# Patient Record
Sex: MALE | Race: BLACK OR AFRICAN AMERICAN | NOT HISPANIC OR LATINO
[De-identification: names, ages, dates, MRNs, and addresses within clinical notes are randomized per-mention and may not be internally consistent; named-entity substitution may affect disease eponyms.]

---

## 2017-02-08 ENCOUNTER — RX RENEWAL (OUTPATIENT)
Age: 77
End: 2017-02-08

## 2017-08-30 ENCOUNTER — RX RENEWAL (OUTPATIENT)
Age: 77
End: 2017-08-30

## 2017-12-13 ENCOUNTER — APPOINTMENT (OUTPATIENT)
Dept: UROLOGY | Facility: CLINIC | Age: 77
End: 2017-12-13
Payer: MEDICARE

## 2017-12-13 PROCEDURE — 99213 OFFICE O/P EST LOW 20 MIN: CPT

## 2017-12-13 PROCEDURE — 51798 US URINE CAPACITY MEASURE: CPT

## 2017-12-18 LAB
ANION GAP SERPL CALC-SCNC: 11 MMOL/L
BASOPHILS # BLD AUTO: 0.09 K/UL
BASOPHILS NFR BLD AUTO: 1.7 %
BUN SERPL-MCNC: 14 MG/DL
CALCIUM SERPL-MCNC: 9.8 MG/DL
CHLORIDE SERPL-SCNC: 99 MMOL/L
CO2 SERPL-SCNC: 31 MMOL/L
CREAT SERPL-MCNC: 1.15 MG/DL
EOSINOPHIL # BLD AUTO: 0.26 K/UL
EOSINOPHIL NFR BLD AUTO: 4.8 %
GLUCOSE SERPL-MCNC: 96 MG/DL
HCT VFR BLD CALC: 43.8 %
HGB BLD-MCNC: 14 G/DL
IMM GRANULOCYTES NFR BLD AUTO: 0.2 %
LYMPHOCYTES # BLD AUTO: 1.76 K/UL
LYMPHOCYTES NFR BLD AUTO: 32.3 %
MAN DIFF?: NORMAL
MCHC RBC-ENTMCNC: 27.8 PG
MCHC RBC-ENTMCNC: 32 GM/DL
MCV RBC AUTO: 86.9 FL
MONOCYTES # BLD AUTO: 0.55 K/UL
MONOCYTES NFR BLD AUTO: 10.1 %
NEUTROPHILS # BLD AUTO: 2.78 K/UL
NEUTROPHILS NFR BLD AUTO: 50.9 %
PLATELET # BLD AUTO: 184 K/UL
POTASSIUM SERPL-SCNC: 4.5 MMOL/L
PSA SERPL-MCNC: 0.17 NG/ML
RBC # BLD: 5.04 M/UL
RBC # FLD: 15.6 %
SODIUM SERPL-SCNC: 141 MMOL/L
TESTOST SERPL-MCNC: 412.6 NG/DL
WBC # FLD AUTO: 5.45 K/UL

## 2018-06-18 ENCOUNTER — APPOINTMENT (OUTPATIENT)
Dept: UROLOGY | Facility: CLINIC | Age: 78
End: 2018-06-18
Payer: MEDICARE

## 2018-06-18 VITALS
DIASTOLIC BLOOD PRESSURE: 80 MMHG | TEMPERATURE: 98.2 F | BODY MASS INDEX: 22.96 KG/M2 | HEIGHT: 69 IN | HEART RATE: 85 BPM | OXYGEN SATURATION: 99 % | SYSTOLIC BLOOD PRESSURE: 158 MMHG | WEIGHT: 155 LBS

## 2018-06-18 DIAGNOSIS — R63.4 ABNORMAL WEIGHT LOSS: ICD-10-CM

## 2018-06-18 PROCEDURE — 51798 US URINE CAPACITY MEASURE: CPT

## 2018-06-18 PROCEDURE — 99213 OFFICE O/P EST LOW 20 MIN: CPT | Mod: 25

## 2018-06-20 LAB — PSA SERPL-MCNC: 0.19 NG/ML

## 2018-08-29 ENCOUNTER — APPOINTMENT (OUTPATIENT)
Dept: UROLOGY | Facility: CLINIC | Age: 78
End: 2018-08-29
Payer: MEDICARE

## 2018-08-29 PROCEDURE — 51798 US URINE CAPACITY MEASURE: CPT

## 2018-08-29 PROCEDURE — 99214 OFFICE O/P EST MOD 30 MIN: CPT | Mod: 25

## 2018-08-31 LAB
APPEARANCE: CLEAR
BACTERIA: NEGATIVE
BILIRUBIN URINE: NEGATIVE
BLOOD URINE: NEGATIVE
COLOR: YELLOW
GLUCOSE QUALITATIVE U: NEGATIVE MG/DL
KETONES URINE: NEGATIVE
LEUKOCYTE ESTERASE URINE: NEGATIVE
MICROSCOPIC-UA: NORMAL
NITRITE URINE: NEGATIVE
PH URINE: 7.5
PROTEIN URINE: NEGATIVE MG/DL
RED BLOOD CELLS URINE: 1 /HPF
SPECIFIC GRAVITY URINE: 1.02
SQUAMOUS EPITHELIAL CELLS: 0 /HPF
UROBILINOGEN URINE: NEGATIVE MG/DL
WHITE BLOOD CELLS URINE: 0 /HPF

## 2019-08-14 ENCOUNTER — APPOINTMENT (OUTPATIENT)
Dept: UROLOGY | Facility: CLINIC | Age: 79
End: 2019-08-14
Payer: MEDICARE

## 2019-08-14 PROCEDURE — 51798 US URINE CAPACITY MEASURE: CPT

## 2019-08-14 PROCEDURE — 99213 OFFICE O/P EST LOW 20 MIN: CPT | Mod: 25

## 2019-08-15 LAB
APPEARANCE: CLEAR
BACTERIA: NEGATIVE
BILIRUBIN URINE: NEGATIVE
BLOOD URINE: NEGATIVE
COLOR: NORMAL
GLUCOSE QUALITATIVE U: NEGATIVE
HYALINE CASTS: 0 /LPF
KETONES URINE: NEGATIVE
LEUKOCYTE ESTERASE URINE: NEGATIVE
MICROSCOPIC-UA: NORMAL
NITRITE URINE: NEGATIVE
PH URINE: 7.5
PROTEIN URINE: NEGATIVE
PSA FREE FLD-MCNC: NORMAL %
PSA FREE SERPL-MCNC: <0.01 NG/ML
PSA SERPL-MCNC: 0.17 NG/ML
RED BLOOD CELLS URINE: 0 /HPF
SPECIFIC GRAVITY URINE: 1.02
SQUAMOUS EPITHELIAL CELLS: 0 /HPF
UROBILINOGEN URINE: NORMAL
WHITE BLOOD CELLS URINE: 0 /HPF

## 2019-09-13 ENCOUNTER — OUTPATIENT (OUTPATIENT)
Dept: OUTPATIENT SERVICES | Facility: HOSPITAL | Age: 79
LOS: 1 days | End: 2019-09-13
Payer: MEDICARE

## 2019-09-13 ENCOUNTER — OUTPATIENT (OUTPATIENT)
Dept: OUTPATIENT SERVICES | Facility: HOSPITAL | Age: 79
LOS: 1 days | End: 2019-09-13

## 2019-09-13 ENCOUNTER — APPOINTMENT (OUTPATIENT)
Dept: ULTRASOUND IMAGING | Facility: IMAGING CENTER | Age: 79
End: 2019-09-13

## 2019-09-13 ENCOUNTER — APPOINTMENT (OUTPATIENT)
Dept: CV DIAGNOSITCS | Facility: HOSPITAL | Age: 79
End: 2019-09-13

## 2019-09-13 DIAGNOSIS — I34.8 OTHER NONRHEUMATIC MITRAL VALVE DISORDERS: ICD-10-CM

## 2019-09-13 DIAGNOSIS — Z00.8 ENCOUNTER FOR OTHER GENERAL EXAMINATION: ICD-10-CM

## 2019-09-13 PROCEDURE — 93970 EXTREMITY STUDY: CPT | Mod: 26

## 2019-09-17 ENCOUNTER — NON-APPOINTMENT (OUTPATIENT)
Age: 79
End: 2019-09-17

## 2019-09-17 ENCOUNTER — APPOINTMENT (OUTPATIENT)
Dept: CARDIOLOGY | Facility: CLINIC | Age: 79
End: 2019-09-17
Payer: MEDICARE

## 2019-09-17 VITALS
HEART RATE: 75 BPM | WEIGHT: 167 LBS | DIASTOLIC BLOOD PRESSURE: 90 MMHG | BODY MASS INDEX: 24.73 KG/M2 | SYSTOLIC BLOOD PRESSURE: 170 MMHG | OXYGEN SATURATION: 99 % | HEIGHT: 69 IN

## 2019-09-17 VITALS — DIASTOLIC BLOOD PRESSURE: 84 MMHG | SYSTOLIC BLOOD PRESSURE: 172 MMHG

## 2019-09-17 DIAGNOSIS — R60.0 LOCALIZED EDEMA: ICD-10-CM

## 2019-09-17 DIAGNOSIS — Z82.49 FAMILY HISTORY OF ISCHEMIC HEART DISEASE AND OTHER DISEASES OF THE CIRCULATORY SYSTEM: ICD-10-CM

## 2019-09-17 PROCEDURE — 93000 ELECTROCARDIOGRAM COMPLETE: CPT

## 2019-09-17 RX ORDER — MIRABEGRON 50 MG/1
50 TABLET, FILM COATED, EXTENDED RELEASE ORAL
Qty: 90 | Refills: 3 | Status: DISCONTINUED | COMMUNITY
Start: 2019-08-14 | End: 2019-09-17

## 2019-09-17 NOTE — REVIEW OF SYSTEMS
[Shortness Of Breath] : no shortness of breath [Chest Pain] : no chest pain [Lower Ext Edema] : lower extremity edema [Palpitations] : no palpitations [Joint Pain] : joint pain [see HPI] : see HPI [Negative] : Heme/Lymph

## 2019-09-17 NOTE — PHYSICAL EXAM
[General Appearance - Well Developed] : well developed [General Appearance - Well Nourished] : well nourished [General Appearance - In No Acute Distress] : no acute distress [Normal Conjunctiva] : the conjunctiva exhibited no abnormalities [No Oral Pallor] : no oral pallor [Normal Jugular Venous V Waves Present] : normal jugular venous V waves present [Heart Sounds] : normal S1 and S2 [Arterial Pulses Normal] : the arterial pulses were normal [Edema] : no peripheral edema present [Regular] : the rhythm was regular [Respiration, Rhythm And Depth] : normal respiratory rhythm and effort [Auscultation Breath Sounds / Voice Sounds] : lungs were clear to auscultation bilaterally [Bowel Sounds] : normal bowel sounds [Abdomen Soft] : soft [FreeTextEntry1] : He walks with a cane. [Cyanosis, Localized] : no localized cyanosis [Skin Turgor] : normal skin turgor [Oriented To Time, Place, And Person] : oriented to person, place, and time [Affect] : the affect was normal [Impaired Insight] : insight and judgment were intact

## 2019-09-17 NOTE — HISTORY OF PRESENT ILLNESS
[FreeTextEntry1] : He sought my evaluation after his internist suggested that he be evaluated for his leg edema.  He is a 79-year-old with long-standing hypertension who was noted by his internist to have bilateral leg edema.  He is accompanied by his daughter.\par She reports that he does not perform routine aerobic exercise due to some chronic gait instability and walks with a cane.  He has no exertional chest pains or shortness of breath but his leg swelling has been worsening over the past months.  His edema resolves almost completely when he wakes up in the morning.\par He reports taking his medications somewhat inconsistently, particularly the losartan/hydrochlorothiazide combination pill which he does not take when he needs to travel because it makes him urinate more than he would like.\par He denies any diabetes mellitus, smoking or family history of premature coronary artery disease.\par

## 2019-09-17 NOTE — DISCUSSION/SUMMARY
[FreeTextEntry1] : He is an 79-year-old with long-standing chronic hypertension who now presents with poorly controlled blood pressure and bilateral leg edema.  His leg edema is likely multifactorial including amlodipine and possible diastolic dysfunction.\par I have suggested that he obtain an echocardiogram and plan to uptitrate his diuretic to 25 mg of hydrochlorothiazide daily.  I suggested  the losartan from the hydrochlorothiazide so that he may take the losartan consistently in addition to the amlodipine without concerns of its diuretic effect.\par I stressed the need for consistent medical therapy in any case and he and his daughter appear to understand.\par

## 2019-09-19 ENCOUNTER — APPOINTMENT (OUTPATIENT)
Dept: CARDIOLOGY | Facility: CLINIC | Age: 79
End: 2019-09-19
Payer: MEDICARE

## 2019-09-19 PROCEDURE — 93306 TTE W/DOPPLER COMPLETE: CPT

## 2019-10-08 ENCOUNTER — RX RENEWAL (OUTPATIENT)
Age: 79
End: 2019-10-08

## 2019-10-08 ENCOUNTER — APPOINTMENT (OUTPATIENT)
Dept: CARDIOLOGY | Facility: CLINIC | Age: 79
End: 2019-10-08

## 2019-10-08 VITALS — SYSTOLIC BLOOD PRESSURE: 180 MMHG | DIASTOLIC BLOOD PRESSURE: 90 MMHG

## 2019-10-08 DIAGNOSIS — I10 ESSENTIAL (PRIMARY) HYPERTENSION: ICD-10-CM

## 2019-10-08 DIAGNOSIS — Z00.00 ENCOUNTER FOR GENERAL ADULT MEDICAL EXAMINATION W/OUT ABNORMAL FINDINGS: ICD-10-CM

## 2019-10-08 RX ORDER — AMLODIPINE BESYLATE 5 MG/1
5 TABLET ORAL
Refills: 0 | Status: DISCONTINUED | COMMUNITY
End: 2019-10-08

## 2019-10-08 RX ORDER — LOSARTAN POTASSIUM AND HYDROCHLOROTHIAZIDE 100; 12.5 MG/1; MG/1
100-12.5 TABLET, FILM COATED ORAL
Refills: 0 | Status: DISCONTINUED | COMMUNITY
End: 2019-10-08

## 2019-10-10 LAB
ALBUMIN SERPL ELPH-MCNC: 4.6 G/DL
ALP BLD-CCNC: 74 U/L
ALT SERPL-CCNC: 17 U/L
ANION GAP SERPL CALC-SCNC: 12 MMOL/L
AST SERPL-CCNC: 20 U/L
BILIRUB SERPL-MCNC: 0.5 MG/DL
BUN SERPL-MCNC: 18 MG/DL
CALCIUM SERPL-MCNC: 10 MG/DL
CHLORIDE SERPL-SCNC: 97 MMOL/L
CO2 SERPL-SCNC: 29 MMOL/L
CREAT SERPL-MCNC: 1.23 MG/DL
GLUCOSE SERPL-MCNC: 93 MG/DL
POTASSIUM SERPL-SCNC: 3.8 MMOL/L
PROT SERPL-MCNC: 7.7 G/DL
SODIUM SERPL-SCNC: 138 MMOL/L

## 2019-10-22 ENCOUNTER — APPOINTMENT (OUTPATIENT)
Dept: CARDIOLOGY | Facility: CLINIC | Age: 79
End: 2019-10-22
Payer: MEDICARE

## 2019-10-22 VITALS — DIASTOLIC BLOOD PRESSURE: 78 MMHG | SYSTOLIC BLOOD PRESSURE: 148 MMHG

## 2019-10-22 VITALS — SYSTOLIC BLOOD PRESSURE: 160 MMHG | OXYGEN SATURATION: 99 % | HEART RATE: 83 BPM | DIASTOLIC BLOOD PRESSURE: 80 MMHG

## 2019-10-22 PROCEDURE — 99211 OFF/OP EST MAY X REQ PHY/QHP: CPT

## 2020-02-19 ENCOUNTER — APPOINTMENT (OUTPATIENT)
Dept: UROLOGY | Facility: CLINIC | Age: 80
End: 2020-02-19

## 2020-06-08 ENCOUNTER — RX RENEWAL (OUTPATIENT)
Age: 80
End: 2020-06-08

## 2020-09-16 ENCOUNTER — APPOINTMENT (OUTPATIENT)
Dept: UROLOGY | Facility: CLINIC | Age: 80
End: 2020-09-16

## 2020-10-21 ENCOUNTER — APPOINTMENT (OUTPATIENT)
Dept: UROLOGY | Facility: CLINIC | Age: 80
End: 2020-10-21
Payer: MEDICARE

## 2020-10-21 VITALS
DIASTOLIC BLOOD PRESSURE: 76 MMHG | SYSTOLIC BLOOD PRESSURE: 134 MMHG | HEART RATE: 96 BPM | TEMPERATURE: 98.6 F | OXYGEN SATURATION: 99 %

## 2020-10-21 DIAGNOSIS — I10 ESSENTIAL (PRIMARY) HYPERTENSION: ICD-10-CM

## 2020-10-21 PROCEDURE — 99213 OFFICE O/P EST LOW 20 MIN: CPT

## 2020-10-21 RX ORDER — AMLODIPINE BESYLATE 5 MG/1
5 TABLET ORAL DAILY
Qty: 90 | Refills: 3 | Status: DISCONTINUED | COMMUNITY
Start: 2020-10-21 | End: 2020-10-21

## 2020-10-21 RX ORDER — HYDROCHLOROTHIAZIDE 25 MG/1
25 TABLET ORAL DAILY
Qty: 90 | Refills: 3 | Status: DISCONTINUED | COMMUNITY
Start: 2020-10-21 | End: 2020-10-21

## 2020-10-26 LAB
APPEARANCE: CLEAR
BACTERIA: NEGATIVE
BILIRUBIN URINE: NEGATIVE
BLOOD URINE: NEGATIVE
COLOR: YELLOW
GLUCOSE QUALITATIVE U: NEGATIVE
HYALINE CASTS: 0 /LPF
KETONES URINE: NEGATIVE
LEUKOCYTE ESTERASE URINE: NEGATIVE
MICROSCOPIC-UA: NORMAL
NITRITE URINE: NEGATIVE
PH URINE: 7
PROTEIN URINE: NEGATIVE
PSA SERPL-MCNC: 0.16 NG/ML
RED BLOOD CELLS URINE: 0 /HPF
SPECIFIC GRAVITY URINE: 1.02
SQUAMOUS EPITHELIAL CELLS: 1 /HPF
UROBILINOGEN URINE: NORMAL
WHITE BLOOD CELLS URINE: 1 /HPF

## 2020-12-17 ENCOUNTER — RX RENEWAL (OUTPATIENT)
Age: 80
End: 2020-12-17

## 2020-12-17 RX ORDER — AMLODIPINE BESYLATE 10 MG/1
10 TABLET ORAL DAILY
Qty: 90 | Refills: 1 | Status: ACTIVE | COMMUNITY
Start: 2019-10-08 | End: 1900-01-01

## 2021-01-11 ENCOUNTER — RX RENEWAL (OUTPATIENT)
Age: 81
End: 2021-01-11

## 2021-01-11 RX ORDER — LOSARTAN POTASSIUM 100 MG/1
100 TABLET, FILM COATED ORAL DAILY
Qty: 90 | Refills: 2 | Status: ACTIVE | COMMUNITY
Start: 2019-10-08 | End: 1900-01-01

## 2021-08-10 ENCOUNTER — APPOINTMENT (OUTPATIENT)
Dept: ORTHOPEDIC SURGERY | Facility: CLINIC | Age: 81
End: 2021-08-10
Payer: MEDICARE

## 2021-08-10 VITALS
BODY MASS INDEX: 25.77 KG/M2 | WEIGHT: 174 LBS | SYSTOLIC BLOOD PRESSURE: 182 MMHG | DIASTOLIC BLOOD PRESSURE: 93 MMHG | HEIGHT: 69 IN | HEART RATE: 90 BPM | OXYGEN SATURATION: 98 %

## 2021-08-10 PROCEDURE — 99204 OFFICE O/P NEW MOD 45 MIN: CPT

## 2021-08-10 RX ORDER — MIRABEGRON 50 MG/1
50 TABLET, FILM COATED, EXTENDED RELEASE ORAL
Qty: 90 | Refills: 3 | Status: COMPLETED | COMMUNITY
Start: 2018-08-29 | End: 2021-08-10

## 2021-08-10 RX ORDER — HYALURONATE SODIUM 20 MG/2 ML
20 SYRINGE (ML) INTRAARTICULAR
Qty: 2 | Refills: 0 | Status: ACTIVE | OUTPATIENT
Start: 2021-08-10

## 2021-08-10 RX ORDER — HYDROCHLOROTHIAZIDE 25 MG/1
25 TABLET ORAL DAILY
Qty: 90 | Refills: 1 | Status: COMPLETED | COMMUNITY
Start: 2019-10-08 | End: 2021-08-10

## 2021-08-10 NOTE — HISTORY OF PRESENT ILLNESS
[Worsening] : worsening [___ yrs] : [unfilled] year(s) ago [0] : a minimum pain level of 0/10 [5] : a maximum pain level of 5/10 [Constant] : ~He/She~ states the symptoms seem to be constant [Walking] : worsened by walking [Knee Flexion] : worsened with knee flexion [Rest] : relieved by rest [de-identified] : Pt presents for initial evaluation with pain in his bilateral knees worse to the right knee, there is buckling. Pt is under the care of  PCP Dr Debora Michelle pt is taking Tylenol for pain who ordered xray ,  xray taken bilateral  knees Stephanie Champ 7/26/2021 while standing. Pt daughter states pt is having pins and needles in his bilateral legs, he is seeing a neurologist.  [de-identified] : certain movements [de-identified] : Voltaren cream and Tylenol

## 2021-08-10 NOTE — DISCUSSION/SUMMARY
[de-identified] : Patient and his accompanying daughter were the advised the findings of bilateral knee arthritis.  He was referred to physical therapy and will likely require viscosupplementation as an adjunct modality.  Therefore, authorization should be obtained for bilateral Euflexxa injections to the knees.  This should be considered a medical necessity

## 2021-08-10 NOTE — PHYSICAL EXAM
[de-identified] : Physical examination of the right knee discloses tenderness to the medial joint line on palpation with range of motion limited to  degrees.  Mild effusion is present to both knees.  Range of motion is slightly greater on the left side between 0 to 115 degrees [de-identified] : Review of x-rays taken July 27, 2021 disclose severe medial joint space narrowing near bone-on-bone to both knees

## 2021-08-24 ENCOUNTER — APPOINTMENT (OUTPATIENT)
Dept: ORTHOPEDIC SURGERY | Facility: CLINIC | Age: 81
End: 2021-08-24
Payer: MEDICARE

## 2021-08-24 PROCEDURE — 20611 DRAIN/INJ JOINT/BURSA W/US: CPT | Mod: RT

## 2021-08-24 PROCEDURE — 99213 OFFICE O/P EST LOW 20 MIN: CPT | Mod: 25

## 2021-08-24 NOTE — PROCEDURE
[de-identified] : Under sterile technique using ultrasound-guided needle placement both knees underwent Euflexxa injections.\par \par A discussion took place with the patient regarding the procedure. General risks and benefits were reviewed.\par The suprapatellar region of the knees was prepped to attain a sterile field. Ethyl Chloride spray was used as a topical anesthetic. \par A 22-gauge 1-1/2 inch needle was used to inject the medication.\par The procedure was performed utilizing ultrasound guided needle placement with the Sonosite linear transducer in order to visualize and confirm the location of the needle tip and intra-articular delivery of the medication in the exact location desired to achieve maximal benefit from the medication. The images were recorded and saved.\par The injection site was sterilely dressed and the patient tolerated the procedure well, without complications.\par The patient was given post injection instructions including rest, cool pack applications, and take NSAIDs or acetaminophen. The patient was advised that if there are worsening symptoms or any associated problems, to contact our office accordingly\par

## 2021-08-24 NOTE — DISCUSSION/SUMMARY
[de-identified] : Following the patient's first set of injections she return in 1 week for her second Euflexxa injection to both knees

## 2021-08-24 NOTE — HISTORY OF PRESENT ILLNESS
[Worsening] : worsening [___ mths] : [unfilled] month(s) ago [3] : a maximum pain level of 3/10 [Walking] : walking [Intermit.] : ~He/She~ states the symptoms seem to be intermittent [Rest] : relieved by rest [de-identified] : Pt returns for his bilateral knee pain, pt is receiving his first set  bilateral Euflexxa injections : Euflexxa #[id number] to the left knee and Euflexxa #[id number] to the  right  knee [de-identified] : certain movements

## 2021-08-24 NOTE — PHYSICAL EXAM
[de-identified] : Physical examination of both knees disclose mild effusions with medial sided joint line tenderness to palpation bilaterally

## 2021-08-30 ENCOUNTER — APPOINTMENT (OUTPATIENT)
Dept: UROLOGY | Facility: CLINIC | Age: 81
End: 2021-08-30
Payer: MEDICARE

## 2021-08-30 PROCEDURE — 99214 OFFICE O/P EST MOD 30 MIN: CPT

## 2021-08-31 ENCOUNTER — APPOINTMENT (OUTPATIENT)
Dept: ORTHOPEDIC SURGERY | Facility: CLINIC | Age: 81
End: 2021-08-31
Payer: MEDICARE

## 2021-08-31 PROCEDURE — 20611 DRAIN/INJ JOINT/BURSA W/US: CPT | Mod: LT

## 2021-08-31 NOTE — PROCEDURE
[de-identified] : Pt returns for his bilateral knee pain, pt is receiving his second set bilateral Euflexxa injections : Euflexxa #[id number] to the left knee and Euflexxa #[id number] to the right knee\par \par A discussion took place with the patient regarding the procedure. General risks and benefits were reviewed.\par The suprapatellar region of the knees was prepped to attain a sterile field. Ethyl Chloride spray was used as a topical anesthetic. \par A 22-gauge 1-1/2 inch needle was used to inject the medication.\par The procedure was performed utilizing ultrasound guided needle placement with the SonWeOrder LTDte linear transducer in order to visualize and confirm the location of the needle tip and intra-articular delivery of the medication in the exact location desired to achieve maximal benefit from the medication. The images were recorded and saved.\par The injection site was sterilely dressed and the patient tolerated the procedure well, without complications.\par The patient was given post injection instructions including rest, cool pack applications, and take NSAIDs or acetaminophen. The patient was advised that if there are worsening symptoms or any associated problems, to contact our office accordingly

## 2021-09-01 ENCOUNTER — APPOINTMENT (OUTPATIENT)
Dept: ORTHOPEDIC SURGERY | Facility: CLINIC | Age: 81
End: 2021-09-01

## 2021-09-01 LAB — PSA SERPL-MCNC: 0.18 NG/ML

## 2021-09-10 ENCOUNTER — APPOINTMENT (OUTPATIENT)
Dept: ORTHOPEDIC SURGERY | Facility: CLINIC | Age: 81
End: 2021-09-10
Payer: MEDICARE

## 2021-09-10 PROCEDURE — 20611 DRAIN/INJ JOINT/BURSA W/US: CPT

## 2021-09-10 NOTE — PROCEDURE
[de-identified] : Pt returns for his bilateral knee pain, pt is receiving his third set bilateral Euflexxa injections : Euflexxa #[id number] to the left knee and Euflexxa #[id number] to the right knee\par \par A discussion took place with the patient regarding the procedure. General risks and benefits were reviewed.\par The suprapatellar region of the knees was prepped to attain a sterile field. Ethyl Chloride spray was used as a topical anesthetic. \par A 22-gauge 1-1/2 inch needle was used to inject the medication.\par The procedure was performed utilizing ultrasound guided needle placement with the SonFixyate linear transducer in order to visualize and confirm the location of the needle tip and intra-articular delivery of the medication in the exact location desired to achieve maximal benefit from the medication. The images were recorded and saved.\par The injection site was sterilely dressed and the patient tolerated the procedure well, without complications.\par The patient was given post injection instructions including rest, cool pack applications, and take NSAIDs or acetaminophen. The patient was advised that if there are worsening symptoms or any associated problems, to contact our office accordingly

## 2021-09-17 ENCOUNTER — EMERGENCY (EMERGENCY)
Facility: HOSPITAL | Age: 81
LOS: 1 days | Discharge: HOME | End: 2021-09-17
Attending: EMERGENCY MEDICINE | Admitting: STUDENT IN AN ORGANIZED HEALTH CARE EDUCATION/TRAINING PROGRAM
Payer: MEDICARE

## 2021-09-17 VITALS
HEART RATE: 77 BPM | OXYGEN SATURATION: 100 % | SYSTOLIC BLOOD PRESSURE: 168 MMHG | RESPIRATION RATE: 18 BRPM | TEMPERATURE: 98 F | HEIGHT: 69 IN | DIASTOLIC BLOOD PRESSURE: 82 MMHG

## 2021-09-17 LAB
ALBUMIN SERPL ELPH-MCNC: 4.2 G/DL — SIGNIFICANT CHANGE UP (ref 3.3–5)
ALP SERPL-CCNC: 82 U/L — SIGNIFICANT CHANGE UP (ref 40–120)
ALT FLD-CCNC: 10 U/L — SIGNIFICANT CHANGE UP (ref 4–41)
ANION GAP SERPL CALC-SCNC: 8 MMOL/L — SIGNIFICANT CHANGE UP (ref 7–14)
APPEARANCE UR: CLEAR — SIGNIFICANT CHANGE UP
AST SERPL-CCNC: 19 U/L — SIGNIFICANT CHANGE UP (ref 4–40)
BASOPHILS # BLD AUTO: 0.1 K/UL — SIGNIFICANT CHANGE UP (ref 0–0.2)
BASOPHILS NFR BLD AUTO: 0.8 % — SIGNIFICANT CHANGE UP (ref 0–2)
BILIRUB SERPL-MCNC: 0.5 MG/DL — SIGNIFICANT CHANGE UP (ref 0.2–1.2)
BILIRUB UR-MCNC: NEGATIVE — SIGNIFICANT CHANGE UP
BUN SERPL-MCNC: 19 MG/DL — SIGNIFICANT CHANGE UP (ref 7–23)
CALCIUM SERPL-MCNC: 9.8 MG/DL — SIGNIFICANT CHANGE UP (ref 8.4–10.5)
CHLORIDE SERPL-SCNC: 97 MMOL/L — LOW (ref 98–107)
CO2 SERPL-SCNC: 32 MMOL/L — HIGH (ref 22–31)
COLOR SPEC: SIGNIFICANT CHANGE UP
CREAT SERPL-MCNC: 1.35 MG/DL — HIGH (ref 0.5–1.3)
DIFF PNL FLD: NEGATIVE — SIGNIFICANT CHANGE UP
EOSINOPHIL # BLD AUTO: 0.05 K/UL — SIGNIFICANT CHANGE UP (ref 0–0.5)
EOSINOPHIL NFR BLD AUTO: 0.4 % — SIGNIFICANT CHANGE UP (ref 0–6)
GLUCOSE SERPL-MCNC: 147 MG/DL — HIGH (ref 70–99)
GLUCOSE UR QL: NEGATIVE — SIGNIFICANT CHANGE UP
HCT VFR BLD CALC: 45 % — SIGNIFICANT CHANGE UP (ref 39–50)
HGB BLD-MCNC: 14.5 G/DL — SIGNIFICANT CHANGE UP (ref 13–17)
IANC: 11 K/UL — HIGH (ref 1.5–8.5)
IMM GRANULOCYTES NFR BLD AUTO: 0.3 % — SIGNIFICANT CHANGE UP (ref 0–1.5)
KETONES UR-MCNC: ABNORMAL
LEUKOCYTE ESTERASE UR-ACNC: NEGATIVE — SIGNIFICANT CHANGE UP
LYMPHOCYTES # BLD AUTO: 0.99 K/UL — LOW (ref 1–3.3)
LYMPHOCYTES # BLD AUTO: 7.8 % — LOW (ref 13–44)
MCHC RBC-ENTMCNC: 27 PG — SIGNIFICANT CHANGE UP (ref 27–34)
MCHC RBC-ENTMCNC: 32.2 GM/DL — SIGNIFICANT CHANGE UP (ref 32–36)
MCV RBC AUTO: 83.8 FL — SIGNIFICANT CHANGE UP (ref 80–100)
MONOCYTES # BLD AUTO: 0.58 K/UL — SIGNIFICANT CHANGE UP (ref 0–0.9)
MONOCYTES NFR BLD AUTO: 4.5 % — SIGNIFICANT CHANGE UP (ref 2–14)
NEUTROPHILS # BLD AUTO: 11 K/UL — HIGH (ref 1.8–7.4)
NEUTROPHILS NFR BLD AUTO: 86.2 % — HIGH (ref 43–77)
NITRITE UR-MCNC: NEGATIVE — SIGNIFICANT CHANGE UP
NRBC # BLD: 0 /100 WBCS — SIGNIFICANT CHANGE UP
NRBC # FLD: 0 K/UL — SIGNIFICANT CHANGE UP
PH UR: 7 — SIGNIFICANT CHANGE UP (ref 5–8)
PLATELET # BLD AUTO: 190 K/UL — SIGNIFICANT CHANGE UP (ref 150–400)
POTASSIUM SERPL-MCNC: 3.5 MMOL/L — SIGNIFICANT CHANGE UP (ref 3.5–5.3)
POTASSIUM SERPL-SCNC: 3.5 MMOL/L — SIGNIFICANT CHANGE UP (ref 3.5–5.3)
PROT SERPL-MCNC: 7.2 G/DL — SIGNIFICANT CHANGE UP (ref 6–8.3)
PROT UR-MCNC: ABNORMAL
RBC # BLD: 5.37 M/UL — SIGNIFICANT CHANGE UP (ref 4.2–5.8)
RBC # FLD: 13.9 % — SIGNIFICANT CHANGE UP (ref 10.3–14.5)
SARS-COV-2 RNA SPEC QL NAA+PROBE: SIGNIFICANT CHANGE UP
SODIUM SERPL-SCNC: 137 MMOL/L — SIGNIFICANT CHANGE UP (ref 135–145)
SP GR SPEC: 1.02 — SIGNIFICANT CHANGE UP (ref 1–1.05)
TROPONIN T, HIGH SENSITIVITY RESULT: 19 NG/L — SIGNIFICANT CHANGE UP
TROPONIN T, HIGH SENSITIVITY RESULT: 21 NG/L — SIGNIFICANT CHANGE UP
UROBILINOGEN FLD QL: SIGNIFICANT CHANGE UP
WBC # BLD: 12.76 K/UL — HIGH (ref 3.8–10.5)
WBC # FLD AUTO: 12.76 K/UL — HIGH (ref 3.8–10.5)

## 2021-09-17 PROCEDURE — 71045 X-RAY EXAM CHEST 1 VIEW: CPT | Mod: 26

## 2021-09-17 PROCEDURE — 99218: CPT | Mod: 25

## 2021-09-17 PROCEDURE — 93010 ELECTROCARDIOGRAM REPORT: CPT

## 2021-09-17 RX ORDER — HYDROCHLOROTHIAZIDE 25 MG
25 TABLET ORAL DAILY
Refills: 0 | Status: DISCONTINUED | OUTPATIENT
Start: 2021-09-17 | End: 2021-09-20

## 2021-09-17 RX ORDER — AMLODIPINE BESYLATE 2.5 MG/1
5 TABLET ORAL DAILY
Refills: 0 | Status: DISCONTINUED | OUTPATIENT
Start: 2021-09-17 | End: 2021-09-20

## 2021-09-17 RX ORDER — SODIUM CHLORIDE 9 MG/ML
500 INJECTION INTRAMUSCULAR; INTRAVENOUS; SUBCUTANEOUS ONCE
Refills: 0 | Status: COMPLETED | OUTPATIENT
Start: 2021-09-17 | End: 2021-09-17

## 2021-09-17 RX ORDER — LOSARTAN POTASSIUM 100 MG/1
100 TABLET, FILM COATED ORAL DAILY
Refills: 0 | Status: DISCONTINUED | OUTPATIENT
Start: 2021-09-17 | End: 2021-09-20

## 2021-09-17 RX ADMIN — SODIUM CHLORIDE 500 MILLILITER(S): 9 INJECTION INTRAMUSCULAR; INTRAVENOUS; SUBCUTANEOUS at 12:10

## 2021-09-17 NOTE — ED CDU PROVIDER INITIAL DAY NOTE - ATTENDING CONTRIBUTION TO CARE
Pt was seen and evaluated by me. Pt is a 82 y/o male with PMHx of HTN and Prostate cancer who presented to the ED for episode of syncope today. Pt states after drinking tea this morning he felt lightheaded and went up stairs and sat on the toilet. Pt was noted to call out to his daughter and the daughter found him passed out slumped on the toilet and was out for 1-2 minutes. Daughter denies pt had any shaking. Pt states having a previous episode of syncope. Pt denies any headache, neck pain, back pain, fever, chills, nausea, vomiting, SOB, chest pain, or abd pain. Lungs CTA b/l. RRR. Abd soft, non-tender. 5/5 b/l UE and LE. No focal deficits.  Sent to OBS for tele monitoring and echo  Concern for syncope/ACS  Tele monitoring/Echo

## 2021-09-17 NOTE — ED ADULT NURSE NOTE - CHIEF COMPLAINT QUOTE
Patient brought to ER by EMS from home for c/o syncope while having a BM. Pt was constipated for about a week. , Pt is not diabetes. Sal:[phone] son/ [phone] daughter.

## 2021-09-17 NOTE — ED ADULT NURSE NOTE - NSIMPLEMENTINTERV_GEN_ALL_ED
Implemented All Universal Safety Interventions:  [place] to call system. Call bell, personal items and telephone within reach. Instruct patient to call for assistance. Room bathroom lighting operational. Non-slip footwear when patient is off stretcher. Physically safe environment: no spills, clutter or unnecessary equipment. Stretcher in lowest position, wheels locked, appropriate side rails in place.

## 2021-09-17 NOTE — ED PROVIDER NOTE - CLINICAL SUMMARY MEDICAL DECISION MAKING FREE TEXT BOX
81 y o M with PMHx of HTN and Prostate cancer treated with radiation and in remission for 10 years who presents s/p episode of syncope.    Episode most likely vasovagal in nature given prodrome. Will obtain basic labs, orthostatics CXR, EKG and troponin to rule out Cardiac and infectious cause of syncope. Likely admit to CDU for ECHO and for 24 hours of telemetry monitoring.

## 2021-09-17 NOTE — ED CDU PROVIDER INITIAL DAY NOTE - OBJECTIVE STATEMENT
80 y/o male with PMHx of HTN and Prostate cancer who presented to the ED for episode of syncope today. Pt states after drinking tea this morning he felt lightheaded and went up stairs and sat on the toilet. Pt was noted to call out to his daughter and the daughter found him passed out slumped on the toilet and was out for 1-2 minutes. Daughter denies pt had any shaking. Pt states having a previous episode of syncope. Pt denies any headache, neck pain, back pain, fever, chills, nausea, vomiting, SOB, chest pain, or abd pain. Sent to OBS for tele monitoring

## 2021-09-17 NOTE — ED PROVIDER NOTE - PHYSICAL EXAMINATION
VITALS: Reviewed  CONSTITUTIONAL: well developed, well nourished, in no acute distress, speaking in full sentences, nontoxic appearing  SKIN: warm, dry, no rash  HEAD: normocephalic, atraumatic  EYES: PERRL, EOMI, no conjunctival erythema, sclera clear  ENT: patent airway, moist mucous membranes  NECK: supple, no masses  CV:  regular rate, regular rhythm, 2+ radial pulses bilaterally  RESP: no wheezes, no rales, no rhonchi, normal work of breathing  ABD: soft, nontender, nondistended, no rebound, no guarding  MSK: normal ROM, no cyanosis, no edema  NEURO: alert, oriented x3, CN II-XII intact, normal finger to nose, no pronator drift.  PSYCH: cooperative, appropriate

## 2021-09-17 NOTE — ED PROVIDER NOTE - NS ED ROS FT
Review of Systems:CONSTITUTIONAL - No fever, No diaphoresis, No weight change  SKIN - No rash  HEMATOLOGIC - No abnormal bleeding or bruising  EYES - No eye pain, No blurred vision  ENT - No change in hearing, No sore throat, No neck pain, No rhinorrhea, No ear pain  RESPIRATORY - No shortness of breath, No cough  CARDIAC -No chest pain, No palpitations  GI - No abdominal pain, No nausea, No vomiting, No diarrhea, + constipation, No bright red blood per rectum or melena. No flank pain.  - No dysuria, frequency, hematuria.  MUSCULOSKELETAL - No joint paint, No swelling, No back pain  NEUROLOGIC - No numbness, No focal weakness, No headache, No dizziness  All other systems negative, unless specified in HPI Review of Systems:  CONSTITUTIONAL - No fever, No diaphoresis, No weight change  SKIN - No rash  HEMATOLOGIC - No abnormal bleeding or bruising  EYES - No eye pain, No blurred vision  ENT - No change in hearing, No sore throat, No neck pain, No rhinorrhea, No ear pain  RESPIRATORY - No shortness of breath, No cough  CARDIAC -No chest pain, No palpitations  GI - No abdominal pain, No nausea, No vomiting, No diarrhea, + constipation, No bright red blood per rectum or melena. No flank pain.  - No dysuria, frequency, hematuria.  MUSCULOSKELETAL - No joint paint, No swelling, No back pain  NEUROLOGIC - No numbness, No focal weakness, No headache, No dizziness  All other systems negative, unless specified in HPI

## 2021-09-17 NOTE — ED CDU PROVIDER INITIAL DAY NOTE - MEDICAL DECISION MAKING DETAILS
80 y/o male with PMHx of HTN and Prostate cancer who presented to the ED for episode of syncope today.   Sent to OBS for tele and echo  Concern for syncope/ACS  Tele monitoring monitoring/Echo

## 2021-09-17 NOTE — ED PROVIDER NOTE - OBJECTIVE STATEMENT
81 y o M with PMHx of HTN and Prostate cancer treated with radiation and in remission for 10 years who presents s/p episode of syncope. Patient states that he drank hot tea this morning and felt lightheaded, when he went up the stairs in his home he continued to feel this way when he sat down on the toilet in his bathroom to rest. He was concerned at this time and called out for his daughter, and when she found him he slumped over on the toilet seat and passed out for 1-2 minutes. Patient did not have any seizure activity and woke up without any signs of confusion. Of note, patient has been constipated over the last few days and has been taking Miralax with small bowel movements, last was this morning. Patient denies any recent fever, chills, cp, sob, n/v/d, abdominal pain, weakness, dysuria, inc frequency, hematuria.     Patient was able to provide full history which was corroborated with Daughter over the phone.

## 2021-09-17 NOTE — ED PROVIDER NOTE - ATTENDING CONTRIBUTION TO CARE
Pt was seen and evaluated by me. Pt is a 80 y/o male with PMHx of HTN and Prostate cancer who presented to the ED for episode of syncope today. Pt states after drinking tea this morning he felt lightheaded and went up stairs and sat on the toilet. Pt was noted to call out to his daughter and the daughter found him passed out slumped on the toilet and was out for 1-2 minutes. Daughter denies pt had any shaking. Pt states having a previous episode of syncope. Pt denies any headache, neck pain, back pain, fever, chills, nausea, vomiting, SOB, chest pain, or abd pain. Lungs CTA b/l. RRR. Abd soft, non-tender. 5/5 b/l UE and LE. No focal deficits.  Concern for syncope/ACS  Labs, EKG, IVF

## 2021-09-17 NOTE — ED PROVIDER NOTE - PROGRESS NOTE DETAILS
Devin, PGY2: workup thus far unremarkable. discussed with daughter and patient concern for possible arrhythmia vs valve dysfunction. will place in observation for echo/tele monitoring.

## 2021-09-17 NOTE — ED ADULT NURSE NOTE - OBJECTIVE STATEMENT
Calvin RN received pt to spot 22 A&OX4 ambulatory with a cane reports syncopal [id number] this morning after drinking tea, went to sit on toilet where daughter reports she found pt "slumped over". Daughter reports was unresponsive approx 1 min then awoke on his own. Also reports constipation at home for one week, denies abdominal pain or distention, N/V/D. PIV placed, labs sent, VS as documented, will endorse to primary RN.

## 2021-09-17 NOTE — ED ADULT TRIAGE NOTE - CHIEF COMPLAINT QUOTE
Patient brought to ER by EMS from home for c/o syncope while having a BM. Pt was constipated for about a week. , Pt is not diabetes. Sal: Patient brought to ER by EMS from home for c/o syncope while having a BM. Pt was constipated for about a week. , Pt is not diabetes. Sal:[phone] son/ [phone] daughter.

## 2021-09-18 VITALS
HEART RATE: 77 BPM | OXYGEN SATURATION: 100 % | DIASTOLIC BLOOD PRESSURE: 71 MMHG | TEMPERATURE: 98 F | RESPIRATION RATE: 16 BRPM | SYSTOLIC BLOOD PRESSURE: 157 MMHG

## 2021-09-18 PROCEDURE — 93306 TTE W/DOPPLER COMPLETE: CPT | Mod: 26

## 2021-09-18 PROCEDURE — 99217: CPT

## 2021-09-18 RX ADMIN — Medication 25 MILLIGRAM(S): at 05:59

## 2021-09-18 RX ADMIN — AMLODIPINE BESYLATE 5 MILLIGRAM(S): 2.5 TABLET ORAL at 05:59

## 2021-09-18 RX ADMIN — LOSARTAN POTASSIUM 100 MILLIGRAM(S): 100 TABLET, FILM COATED ORAL at 05:59

## 2021-09-18 NOTE — ED CDU PROVIDER DISPOSITION NOTE - NSFOLLOWUPINSTRUCTIONS_ED_ALL_ED_FT
You were seen in the ER for syncope.     Please see below for a copy of your test results including lab work, chest xray, echocardiogram.     Follow up with your Cardiologist as scheduled.     Syncope    Syncope is when you temporarily lose consciousness, also called fainting or passing out. It is caused by a sudden decrease in blood flow to the brain. Even though most causes of syncope are not dangerous, syncope can possibly be a sign of a serious medical problem. Signs that you may be about to faint include feeling dizzy, lightheaded, nausea, visual changes, or cold/clammy skin. Do not drive, operate heavy machinery, or play sports until your health care provider says it is okay.    SEEK IMMEDIATE MEDICAL CARE IF YOU HAVE ANY OF THE FOLLOWING SYMPTOMS: severe headache, pain in your chest/abdomen/back, bleeding from your mouth or rectum, palpitations, shortness of breath, pain with breathing, seizure, confusion, or trouble walking.    Advance activity as tolerated.     Continue all previously prescribed medications as directed unless otherwise instructed.     Follow up with your primary care physician in 48-72 hours- bring copies of your results.

## 2021-09-18 NOTE — ED CDU PROVIDER SUBSEQUENT DAY NOTE - HISTORY
Initial CDU note:82 y/o male with PMHx of HTN and Prostate cancer who presented to the ED for episode of syncope today. Pt states after drinking tea this morning he felt lightheaded and went up stairs and sat on the toilet. Pt was noted to call out to his daughter and the daughter found him passed out slumped on the toilet and was out for 1-2 minutes. Daughter denies pt had any shaking. Pt states having a previous episode of syncope. Pt denies any headache, neck pain, back pain, fever, chills, nausea, vomiting, SOB, chest pain, or abd pain. Sent to OBS for tele monitoring    Subsequent day CDU note: Agree with above. Pt is in CDU for concern of Syncope. In ED, HD stable. Labs reviewed Trop 21>19, EKG- no acute ischemic changes. In the Interim, pt remains stable on cardiac monitor, no acute events. Resting comfortably in no acute distress. Continue current CDU plan, Echo and Tele.

## 2021-09-18 NOTE — ED CDU PROVIDER DISPOSITION NOTE - CLINICAL COURSE
81y Male with PMHx of HTN, prostate cancer in remission presents to the ER for syncope. Reports episode of lightheadedness when going up the stairs followed by syncopal episode while using the rest room. In ER, trop 19 > 21, EKG normal, chest xray clear. Plan for CDU for echo, patient has private Cardiologist. Pending call back from Cards [phone]/PMD. Echo within normal limits. Vital signs stable. No symptoms at this time. Plan to discharge with PMD and Cards follow up as scheduled for outpatient [facility] on 9/27.

## 2021-09-18 NOTE — ED CDU PROVIDER SUBSEQUENT DAY NOTE - ATTENDING CONTRIBUTION TO CARE
81M w h/o HTN, prostate ca p/w episode of syncope, + prodromal symptoms. Denies chest pain, f/c, abdominal pain, or neuro deficits, ECHO WNL, trops stable, ekg w/o changes, patient reports having an appt for holter later this month with his cardiologist. Currently states he is asymptomatic. No events on tele, will likely dc w/ cards f/u, pending call from pcp or cards for follow up, BMP noted, cr w/ mild elevation ( less than 1.5 from prior baseline), no urinary complaints, will instruct patient to repeat w/ primary care outpatient

## 2021-09-18 NOTE — ED CDU PROVIDER DISPOSITION NOTE - PATIENT PORTAL LINK FT
You can access the FollowMyHealth Patient Portal offered by [facility] by registering at the following website: [email or web address]. By joining [facility]’s FollowMyHealth portal, you will also be able to view your health information using other applications (apps) compatible with our system.

## 2021-12-03 ENCOUNTER — APPOINTMENT (OUTPATIENT)
Dept: ORTHOPEDIC SURGERY | Facility: CLINIC | Age: 81
End: 2021-12-03
Payer: MEDICARE

## 2021-12-03 VITALS
SYSTOLIC BLOOD PRESSURE: 173 MMHG | HEART RATE: 82 BPM | WEIGHT: 174 LBS | OXYGEN SATURATION: 98 % | DIASTOLIC BLOOD PRESSURE: 83 MMHG | HEIGHT: 69 IN | BODY MASS INDEX: 25.77 KG/M2

## 2021-12-03 DIAGNOSIS — R26.89 OTHER ABNORMALITIES OF GAIT AND MOBILITY: ICD-10-CM

## 2021-12-03 DIAGNOSIS — M17.0 BILATERAL PRIMARY OSTEOARTHRITIS OF KNEE: ICD-10-CM

## 2021-12-03 DIAGNOSIS — R29.898 OTHER SYMPTOMS AND SIGNS INVOLVING THE MUSCULOSKELETAL SYSTEM: ICD-10-CM

## 2021-12-03 PROCEDURE — 99213 OFFICE O/P EST LOW 20 MIN: CPT

## 2021-12-03 PROCEDURE — 73564 X-RAY EXAM KNEE 4 OR MORE: CPT | Mod: LT,RT

## 2022-03-02 ENCOUNTER — EMERGENCY (EMERGENCY)
Facility: HOSPITAL | Age: 82
LOS: 1 days | Discharge: HOME | End: 2022-03-02
Attending: EMERGENCY MEDICINE | Admitting: EMERGENCY MEDICINE
Payer: MEDICARE

## 2022-03-02 VITALS
OXYGEN SATURATION: 98 % | TEMPERATURE: 97 F | RESPIRATION RATE: 18 BRPM | DIASTOLIC BLOOD PRESSURE: 77 MMHG | HEART RATE: 89 BPM | HEIGHT: 69 IN | SYSTOLIC BLOOD PRESSURE: 164 MMHG

## 2022-03-02 LAB
ALBUMIN SERPL ELPH-MCNC: 4.3 G/DL — SIGNIFICANT CHANGE UP (ref 3.3–5)
ALP SERPL-CCNC: 80 U/L — SIGNIFICANT CHANGE UP (ref 40–120)
ALT FLD-CCNC: 12 U/L — SIGNIFICANT CHANGE UP (ref 4–41)
ANION GAP SERPL CALC-SCNC: 11 MMOL/L — SIGNIFICANT CHANGE UP (ref 7–14)
AST SERPL-CCNC: 21 U/L — SIGNIFICANT CHANGE UP (ref 4–40)
BASOPHILS # BLD AUTO: 0.07 K/UL — SIGNIFICANT CHANGE UP (ref 0–0.2)
BASOPHILS NFR BLD AUTO: 0.7 % — SIGNIFICANT CHANGE UP (ref 0–2)
BILIRUB SERPL-MCNC: 0.3 MG/DL — SIGNIFICANT CHANGE UP (ref 0.2–1.2)
BUN SERPL-MCNC: 20 MG/DL — SIGNIFICANT CHANGE UP (ref 7–23)
CALCIUM SERPL-MCNC: 9.7 MG/DL — SIGNIFICANT CHANGE UP (ref 8.4–10.5)
CHLORIDE SERPL-SCNC: 99 MMOL/L — SIGNIFICANT CHANGE UP (ref 98–107)
CO2 SERPL-SCNC: 27 MMOL/L — SIGNIFICANT CHANGE UP (ref 22–31)
CREAT SERPL-MCNC: 1.31 MG/DL — HIGH (ref 0.5–1.3)
EGFR: 54 ML/MIN/1.73M2 — LOW
EOSINOPHIL # BLD AUTO: 0.1 K/UL — SIGNIFICANT CHANGE UP (ref 0–0.5)
EOSINOPHIL NFR BLD AUTO: 1 % — SIGNIFICANT CHANGE UP (ref 0–6)
GLUCOSE SERPL-MCNC: 175 MG/DL — HIGH (ref 70–99)
HCT VFR BLD CALC: 46 % — SIGNIFICANT CHANGE UP (ref 39–50)
HGB BLD-MCNC: 14.5 G/DL — SIGNIFICANT CHANGE UP (ref 13–17)
IANC: 8.4 K/UL — SIGNIFICANT CHANGE UP (ref 1.5–8.5)
IMM GRANULOCYTES NFR BLD AUTO: 0.3 % — SIGNIFICANT CHANGE UP (ref 0–1.5)
LYMPHOCYTES # BLD AUTO: 1.03 K/UL — SIGNIFICANT CHANGE UP (ref 1–3.3)
LYMPHOCYTES # BLD AUTO: 10 % — LOW (ref 13–44)
MCHC RBC-ENTMCNC: 27 PG — SIGNIFICANT CHANGE UP (ref 27–34)
MCHC RBC-ENTMCNC: 31.5 GM/DL — LOW (ref 32–36)
MCV RBC AUTO: 85.7 FL — SIGNIFICANT CHANGE UP (ref 80–100)
MONOCYTES # BLD AUTO: 0.65 K/UL — SIGNIFICANT CHANGE UP (ref 0–0.9)
MONOCYTES NFR BLD AUTO: 6.3 % — SIGNIFICANT CHANGE UP (ref 2–14)
NEUTROPHILS # BLD AUTO: 8.4 K/UL — HIGH (ref 1.8–7.4)
NEUTROPHILS NFR BLD AUTO: 81.7 % — HIGH (ref 43–77)
NRBC # BLD: 0 /100 WBCS — SIGNIFICANT CHANGE UP
NRBC # FLD: 0 K/UL — SIGNIFICANT CHANGE UP
PLATELET # BLD AUTO: 202 K/UL — SIGNIFICANT CHANGE UP (ref 150–400)
POTASSIUM SERPL-MCNC: 3.8 MMOL/L — SIGNIFICANT CHANGE UP (ref 3.5–5.3)
POTASSIUM SERPL-SCNC: 3.8 MMOL/L — SIGNIFICANT CHANGE UP (ref 3.5–5.3)
PROT SERPL-MCNC: 7.7 G/DL — SIGNIFICANT CHANGE UP (ref 6–8.3)
RBC # BLD: 5.37 M/UL — SIGNIFICANT CHANGE UP (ref 4.2–5.8)
RBC # FLD: 13.5 % — SIGNIFICANT CHANGE UP (ref 10.3–14.5)
SODIUM SERPL-SCNC: 137 MMOL/L — SIGNIFICANT CHANGE UP (ref 135–145)
TROPONIN T, HIGH SENSITIVITY RESULT: 19 NG/L — SIGNIFICANT CHANGE UP
TROPONIN T, HIGH SENSITIVITY RESULT: 20 NG/L — SIGNIFICANT CHANGE UP
WBC # BLD: 10.28 K/UL — SIGNIFICANT CHANGE UP (ref 3.8–10.5)
WBC # FLD AUTO: 10.28 K/UL — SIGNIFICANT CHANGE UP (ref 3.8–10.5)

## 2022-03-02 PROCEDURE — 99285 EMERGENCY DEPT VISIT HI MDM: CPT | Mod: 25

## 2022-03-02 PROCEDURE — 93010 ELECTROCARDIOGRAM REPORT: CPT

## 2022-03-02 RX ORDER — SODIUM CHLORIDE 9 MG/ML
1000 INJECTION INTRAMUSCULAR; INTRAVENOUS; SUBCUTANEOUS ONCE
Refills: 0 | Status: COMPLETED | OUTPATIENT
Start: 2022-03-02 | End: 2022-03-02

## 2022-03-02 RX ADMIN — SODIUM CHLORIDE 1000 MILLILITER(S): 9 INJECTION INTRAMUSCULAR; INTRAVENOUS; SUBCUTANEOUS at 12:42

## 2022-03-02 NOTE — ED PROVIDER NOTE - CLINICAL SUMMARY MEDICAL DECISION MAKING FREE TEXT BOX
Neena Dillon is a 80yo M w/ PMHx of HTN and prostate cancer reporting to the ED with near syncope. This patient was staffed with my supervising physician, Dr. Thakur; medial record was reviewed. Upon arrival to ED, patient was promptly evaluated. DDX consisted but not limited to hypoglycemia, ACS, PE, GI bleed, AAA. EcG without any evidence of ischemia. No evidence of widened QRS, HOCHM, WPW, or Brugada. No evidence of EcG confirmed etiology of why patient could have had near syncope. Neena Dillon is a 80yo M w/ PMHx of HTN and prostate cancer reporting to the ED with near syncope. This patient was staffed with my supervising physician, Dr. Thakur; medial record was reviewed. Upon arrival to ED, patient was promptly evaluated. DDX consisted but not limited to hypoglycemia, ACS, PE, GI bleed, AAA. EcG without any evidence of ischemia. No evidence of widened QRS, HOCHM, WPW, or Brugada. No evidence of EcG confirmed etiology of why patient could have had near syncope. CBC and CMP reassuring, glucose unremarkable. Troponin x2 detectable but overall unremarkable. Patient was observed ambulating without difficulty. On re-evaluation, he was feeling much better. His daughter was at bedside and commented how good he was looking and feeling. I discussed following up with his primary care physician and to report back to the ED with any recurrence of his symptoms. Advised to have good oral hydration and to follow up with his primary care physician. All questions were answered, patient verbalized understanding, and the patient was discharged in stable condition.

## 2022-03-02 NOTE — ED ADULT NURSE NOTE - OBJECTIVE STATEMENT
Received pt to Trauma B from home s/p syncopal episode. Pt is A&Ox3, skin warm dry unremarkable, + strong regular radial pulses bi laterally. Per pts daughter at bedside this has been happening over the past several weeks and has had negative outpatient work up. Pt denies complaints upon arrival, denies head strike, head neck or back pain. Pt changed into gown and placed on cardiac monitor showing NSR. #20g IV placed to LAC, lab work collected as ordered. MD at bedside will continue to monitor.

## 2022-03-02 NOTE — ED PROVIDER NOTE - CROS ED RESP ALL NEG
"Complaining of pain/cramping 8 out of 10.  States pain is traveling down her legs "it's the same pain as when I took that medicine"; patient is referring to Cytotec. Patient denies shortness of breath. I asked her if the pain in her legs was worse when she was walking?  Patient then stated it doesn't go all the way down her leg, that it stops in her hips. The bleeding is like a light menstrual flow but she is passing blood clots that she does not typically have with her menses; passing on average 4-5 half dollar size blood clots.  Patient is asking for HCG results and when is she to do a repeat ultrasound?    Please advise.  " negative...

## 2022-03-02 NOTE — ED PROVIDER NOTE - NSFOLLOWUPINSTRUCTIONS_ED_ALL_ED_FT
As we discussed, please report back to the ED with any recurrence of the near syncope. Please report back to the ED with any worsening chest pain, shortness of breath, or worsening symptoms.

## 2022-03-02 NOTE — ED PROVIDER NOTE - ATTENDING CONTRIBUTION TO CARE
I performed a face-to-face evaluation of the patient and performed a history and physical examination. I agree with the history and physical examination. If this was a PA visit, I personally saw the patient with the PA and performed a substantive portion of the visit including all aspects of the medical decision making.    Near syncope. Prior thorough w/u. Not orthostatic. Check Hb, EKG, trop. Give IVF. Likely dc home.

## 2022-03-02 NOTE — ED ADULT TRIAGE NOTE - CHIEF COMPLAINT QUOTE
p/t c/o of near syncopal episode this am, denies any chest pain, denies headaches, no neuro deficits noted, fs [id number]

## 2022-03-02 NOTE — ED PROVIDER NOTE - OBJECTIVE STATEMENT
80yo M w/ PMHx of HTN and prostate cancer reporting to the ED with near syncope. Patient reports 2 episodes earlier today of near syncope. He denies any chest pain or shortness of breath with these episodes. Patient reports tunnel vision with these episodes. Denies any falls, head trauma, neck pain, paresthesias, or anticoagulation. Denies any melena, hematochezia, hematemesis. Denies any current chemotherapy.

## 2022-03-02 NOTE — ED PROVIDER NOTE - NEUROLOGICAL, MLM
Alert and oriented, no focal deficits, no motor or sensory deficits. Grossly normal strength and sensation in the C5-T1 and L2-S1 distributions, bilaterally

## 2022-03-02 NOTE — ED PROVIDER NOTE - PATIENT PORTAL LINK FT
You can access the FollowMyHealth Patient Portal offered by [facility] by registering at the following website: [email or web address]. By joining [facility]’s FollowMyHealth portal, you will also be able to view your health information using other applications (apps) compatible with our system.

## 2022-08-02 ENCOUNTER — LABORATORY RESULT (OUTPATIENT)
Age: 82
End: 2022-08-02

## 2022-08-02 ENCOUNTER — APPOINTMENT (OUTPATIENT)
Dept: UROLOGY | Facility: CLINIC | Age: 82
End: 2022-08-02

## 2022-08-02 DIAGNOSIS — R35.0 FREQUENCY OF MICTURITION: ICD-10-CM

## 2022-08-02 DIAGNOSIS — C61 MALIGNANT NEOPLASM OF PROSTATE: ICD-10-CM

## 2022-08-02 PROCEDURE — 99214 OFFICE O/P EST MOD 30 MIN: CPT

## 2022-08-02 RX ORDER — TAMSULOSIN HYDROCHLORIDE 0.4 MG/1
0.4 CAPSULE ORAL
Qty: 30 | Refills: 11 | Status: ACTIVE | COMMUNITY
Start: 2022-08-02 | End: 1900-01-01

## 2022-08-03 NOTE — PHYSICAL EXAM
[General Appearance - Well Developed] : well developed [General Appearance - Well Nourished] : well nourished [Normal Appearance] : normal appearance [Well Groomed] : well groomed [General Appearance - In No Acute Distress] : no acute distress [Abdomen Soft] : soft [Abdomen Tenderness] : non-tender [Costovertebral Angle Tenderness] : no ~M costovertebral angle tenderness [Skin Color & Pigmentation] : normal skin color and pigmentation [Edema] : no peripheral edema [] : no respiratory distress [Respiration, Rhythm And Depth] : normal respiratory rhythm and effort [Exaggerated Use Of Accessory Muscles For Inspiration] : no accessory muscle use [Oriented To Time, Place, And Person] : oriented to person, place, and time [Affect] : the affect was normal [Mood] : the mood was normal [Not Anxious] : not anxious [No Focal Deficits] : no focal deficits [FreeTextEntry1] : Ambulates with a rolling walker.

## 2022-08-03 NOTE — ADDENDUM
[FreeTextEntry1] : This note was authored by Janel Rivers working as a scribe for Dr.Gary Zarate. I, Dr. Wang Zarate have reviewed the content of this note and confirm it is true and accurate. I personally performed the history and physical examination and made all the decisions 08/02/2022.

## 2022-08-03 NOTE — HISTORY OF PRESENT ILLNESS
[FreeTextEntry1] : 08/02/2022: Mr. Chaudhary is an 83 y/o M that presents today for an evaluation of nocturia. He is accompanied by his daughter, Abigail. His daughter takes care of both the patient and his wife. Wife had a neuroendocrine tumor on the pancreas but is doing well. The patient is a prior patient of . Last saw him on 8/30/2021. He received 8500 rads to his prostate and seminal vesicles and nodes after a Depot Lupron shot in 2012 for Rachel 7. He has done well. His PSA in 2015 was 0.28, 2018 0.19, 2019 was 0.17, 2020 PSA was 0.16, and last PSA in 2021 was 0.18. The patient reports today that he has been having increased urinary frequency at night which is bothersome. He voids approximately 6x a night. Had been on Myrbetriq in the past but stopped. \par

## 2022-08-03 NOTE — ASSESSMENT
[FreeTextEntry1] : 08/02/2022: Mr. Chaudhary is an 83 y/o M that presents today for an evaluation of nocturia. He is accompanied by his daughter, Abigail. His daughter takes care of both the patient and his wife. Wife had a neuroendocrine tumor on the pancreas but is doing well. The patient is a prior patient of . Last saw him on 8/30/2021. He received 8500 rads to his prostate and seminal vesicles and nodes after a Depot Lupron shot in 2012 for Rachel 7. He has done well. His PSA in 2015 was 0.28, 2018 0.19, 2019 was 0.17, 2020 PSA was 0.16, and last PSA in 2021 was 0.18. The patient reports today that he has been having increased urinary frequency at night which is bothersome. He voids approximately 6x a night. Had been on Myrbetriq in the past but stopped. \par \par The patient produced a urine sample which will be sent for urinalysis, urine cytology, and urine culture. \par \par Blood work today included BMP, alkaline phosphatase, PSA, and testosterone. \par \par I prescribed the patient Tamsulosin 0.4 mg once daily after a meal. I advised the patient the side of effects of nasal congestion and light-headedness. I advised him to use saline nasal spray if he gets congestion but to avoid decongestion medication as this will worsen his urination. I also informed the patient that if he is sexually active, there will be a decrease in semen volume while taking this medication but this is not harmful. Patient can stay off of any medications he had been on in the past with . \par \par Patient will have a telehealth visit in 3 weeks for reassessment on tamsulosin. \par \par Preparation, in person office visit, and coordination of care: 30 minutes.

## 2022-08-04 DIAGNOSIS — R79.89 OTHER SPECIFIED ABNORMAL FINDINGS OF BLOOD CHEMISTRY: ICD-10-CM

## 2022-08-04 LAB
APPEARANCE: CLEAR
BACTERIA: NEGATIVE
BILIRUBIN URINE: NEGATIVE
BLOOD URINE: NEGATIVE
COLOR: NORMAL
GLUCOSE QUALITATIVE U: NEGATIVE
HYALINE CASTS: 1 /LPF
KETONES URINE: NEGATIVE
LEUKOCYTE ESTERASE URINE: NEGATIVE
MICROSCOPIC-UA: NORMAL
NITRITE URINE: NEGATIVE
PH URINE: 7.5
PROTEIN URINE: NEGATIVE
RED BLOOD CELLS URINE: 0 /HPF
SPECIFIC GRAVITY URINE: 1.02
SQUAMOUS EPITHELIAL CELLS: 0 /HPF
UROBILINOGEN URINE: NORMAL
WHITE BLOOD CELLS URINE: 0 /HPF

## 2022-08-05 ENCOUNTER — NON-APPOINTMENT (OUTPATIENT)
Age: 82
End: 2022-08-05

## 2022-08-06 LAB — URINE CYTOLOGY: NORMAL

## 2022-08-25 ENCOUNTER — APPOINTMENT (OUTPATIENT)
Dept: UROLOGY | Facility: CLINIC | Age: 82
End: 2022-08-25

## 2022-09-13 LAB
APPEARANCE: CLEAR
BACTERIA: NEGATIVE
BILIRUBIN URINE: NEGATIVE
BLOOD URINE: NEGATIVE
COLOR: NORMAL
GLUCOSE QUALITATIVE U: NEGATIVE
HYALINE CASTS: 0 /LPF
KETONES URINE: NEGATIVE
LEUKOCYTE ESTERASE URINE: NEGATIVE
MICROSCOPIC-UA: NORMAL
NITRITE URINE: NEGATIVE
PH URINE: 7
PROTEIN URINE: NEGATIVE
PSA SERPL-MCNC: 0.12 NG/ML
RED BLOOD CELLS URINE: 3 /HPF
SPECIFIC GRAVITY URINE: 1.02
SQUAMOUS EPITHELIAL CELLS: 0 /HPF
UROBILINOGEN URINE: NORMAL
WHITE BLOOD CELLS URINE: 1 /HPF

## 2022-09-14 LAB
ALP BLD-CCNC: 96 U/L
ANION GAP SERPL CALC-SCNC: 11 MMOL/L
BACTERIA UR CULT: NORMAL
BUN SERPL-MCNC: 21 MG/DL
CALCIUM SERPL-MCNC: 9.9 MG/DL
CHLORIDE SERPL-SCNC: 100 MMOL/L
CO2 SERPL-SCNC: 31 MMOL/L
CREAT SERPL-MCNC: 1.52 MG/DL
EGFR: 45 ML/MIN/1.73M2
GLUCOSE SERPL-MCNC: 151 MG/DL
POTASSIUM SERPL-SCNC: 4.7 MMOL/L
SODIUM SERPL-SCNC: 141 MMOL/L
TESTOST SERPL-MCNC: 299 NG/DL

## 2022-10-22 NOTE — ED PROVIDER NOTE - CPE EDP NEURO NORM
Problem: Pain  Goal: Verbalizes/displays adequate comfort level or baseline comfort level  Outcome: Progressing normal...

## 2022-11-07 NOTE — ED PROVIDER NOTE - RESPIRATORY, MLM
Pt has PICC line ordered, per protocol, best practice, and patients condition, a midline is most appropriate at this time. Ordering MD notified with MD request to override protocol. Double Lumen PICC ordered per Greyson Luciano.    Breath sounds clear and equal bilaterally.

## 2022-11-12 LAB
ANION GAP SERPL CALC-SCNC: 13 MMOL/L
BUN SERPL-MCNC: 13 MG/DL
CALCIUM SERPL-MCNC: 10 MG/DL
CHLORIDE SERPL-SCNC: 100 MMOL/L
CO2 SERPL-SCNC: 27 MMOL/L
CREAT SERPL-MCNC: 1.29 MG/DL
EGFR: 55 ML/MIN/1.73M2
GLUCOSE SERPL-MCNC: 102 MG/DL
POTASSIUM SERPL-SCNC: 4.1 MMOL/L
SODIUM SERPL-SCNC: 140 MMOL/L
URINE CYTOLOGY: NORMAL

## 2023-07-06 ENCOUNTER — APPOINTMENT (OUTPATIENT)
Dept: UROLOGY | Facility: CLINIC | Age: 83
End: 2023-07-06
Payer: MEDICARE

## 2023-07-06 ENCOUNTER — RESULT CHARGE (OUTPATIENT)
Age: 83
End: 2023-07-06

## 2023-07-06 VITALS
HEART RATE: 93 BPM | TEMPERATURE: 98.4 F | DIASTOLIC BLOOD PRESSURE: 77 MMHG | WEIGHT: 186 LBS | SYSTOLIC BLOOD PRESSURE: 170 MMHG | BODY MASS INDEX: 28.19 KG/M2 | RESPIRATION RATE: 16 BRPM | HEIGHT: 68 IN | OXYGEN SATURATION: 99 %

## 2023-07-06 PROCEDURE — 99214 OFFICE O/P EST MOD 30 MIN: CPT

## 2023-07-06 PROCEDURE — 51798 US URINE CAPACITY MEASURE: CPT

## 2023-07-06 NOTE — ASSESSMENT
[FreeTextEntry1] : Mr. WARE has seen me today for an evaluation of overactive bladder (OAB) (urinary urgency, frequency, and urge incontinence) and nocturia.  Has significant large volume voids overnight. Nocturnal symptoms most bothersome\par \par His condition is complicated by overlying lower extremity edema, HCTZ use\par IPSS: 22, irritative>obstructive symptoms\par Previously on tamsulosin - d/jose carlos due to light headedness\par \par We have extensively reviewed the OAB care pathway and discussed: \par \par 1st line therapy:  \par       -Behavioral therapy -limiting fluid intake, caffeine, and artificial sweeteners; maintaining a normal weight \par       -Medical comorbidities - managing sleep apnea, diabetes, lower extremity edema, and CHF if present  \par       -Conservative management options - observation, pelvic floor physical therapy \par \par Recommendations\par -PSA\par -continue compression stockings - just started using \par -elevate legs for 90 mins in the evening\par -limit fluids after 7 PM\par -discuss afternoon Lasix use with PCP\par -RTC 1 mo, voiding diary if no improvement \par \par

## 2023-07-06 NOTE — HISTORY OF PRESENT ILLNESS
[FreeTextEntry1] :  82M presents for initial evaluation of urinary incontinence \par Referred by Dr. Zarate \par \par PMH significant for: HTN, prostate cancer, OAB \par PSH significant for:  \par Significant meds: amlodipine, losartan, tamsulosin \par \par Seen by Dr. Zarate in 8/2022 for nocturia 6x/night \par Started on tamsulosin-- has not been taking for couple\par History of prostate cancer in 2012 s/p radiation and lupron \par Last PSA 9/22: 0.12 \par  \par Patient reports years-long history of urinary frequency\par Reports moderate level of distress \par Associated with urge incontinence  \par Previously treated with tamsulosin \par \par Daytime Frequency: 4-5\par Nighttime Frequency: 6\par Pads per day: 2\par Straining to void: no\par Intermittency: yes\par Dribbling: yes\par Subjective Incomplete Emptying: yes\par UTIs this past year: 0\par IPSS: 29, QoL: 4\par PVR: 22cc\par \par Daily Fluid Total: 2 bottles of water \par Daily Caffeine Total: 1 cup of tea\par  \par Neurologic Hx, Vision or Balance changes: no\par \par

## 2023-07-06 NOTE — PHYSICAL EXAM
[General Appearance - Well Developed] : well developed [General Appearance - Well Nourished] : well nourished [Abdomen Soft] : soft [Abdomen Tenderness] : non-tender [FreeTextEntry1] : 1+ pitting edema bilaterally

## 2023-07-07 LAB
PSA FREE FLD-MCNC: NORMAL %
PSA FREE SERPL-MCNC: <0.01 NG/ML
PSA SERPL-MCNC: 0.11 NG/ML

## 2023-07-20 NOTE — REVIEW OF SYSTEMS
[Nocturia] : nocturia [Negative] : Heme/Lymph Double Z Plasty Text: The lesion was extirpated to the level of the fat with a #15 scalpel blade. Given the location of the defect, shape of the defect and the proximity to free margins a double Z-plasty was deemed most appropriate for repair. Using a sterile surgical marker, the appropriate transposition arms of the double Z-plasty were drawn incorporating the defect and placing the expected incisions within the relaxed skin tension lines where possible. The area thus outlined was incised deep to adipose tissue with a #15 scalpel blade. The skin margins were undermined to an appropriate distance in all directions utilizing iris scissors. The opposing transposition arms were then transposed and carried over into place in opposite direction and anchored with interrupted buried subcutaneous sutures.

## 2023-08-10 ENCOUNTER — APPOINTMENT (OUTPATIENT)
Dept: UROLOGY | Facility: CLINIC | Age: 83
End: 2023-08-10
Payer: MEDICARE

## 2023-08-10 ENCOUNTER — APPOINTMENT (OUTPATIENT)
Dept: UROLOGY | Facility: CLINIC | Age: 83
End: 2023-08-10

## 2023-08-10 VITALS — DIASTOLIC BLOOD PRESSURE: 88 MMHG | SYSTOLIC BLOOD PRESSURE: 167 MMHG

## 2023-08-10 VITALS
BODY MASS INDEX: 28.19 KG/M2 | SYSTOLIC BLOOD PRESSURE: 172 MMHG | RESPIRATION RATE: 16 BRPM | OXYGEN SATURATION: 100 % | HEIGHT: 68 IN | HEART RATE: 97 BPM | WEIGHT: 186 LBS | TEMPERATURE: 98.2 F | DIASTOLIC BLOOD PRESSURE: 94 MMHG

## 2023-08-10 DIAGNOSIS — R35.1 NOCTURIA: ICD-10-CM

## 2023-08-10 DIAGNOSIS — N32.81 OVERACTIVE BLADDER: ICD-10-CM

## 2023-08-10 DIAGNOSIS — R60.0 LOCALIZED EDEMA: ICD-10-CM

## 2023-08-10 PROCEDURE — 99213 OFFICE O/P EST LOW 20 MIN: CPT

## 2023-08-10 NOTE — HISTORY OF PRESENT ILLNESS
[FreeTextEntry1] : 83M presents for follow up evaluation   h/o nocturia/UUI  Using compressions stockings  Recommended elevating legs for 90 mins in evening and limiting fluids after 7pm   Recommended discussing afternoon lasix use with PCP   Reports inconsistent compression stocking use Reports overnight thirst and drinking throughout the night States Lasix 40 had been dosed BID, causing light-headedness, discontinued all together Daughter states PCP working to optimize

## 2023-08-10 NOTE — PHYSICAL EXAM
[General Appearance - Well Developed] : well developed [General Appearance - Well Nourished] : well nourished [Normal Station and Gait] : the gait and station were normal for the patient's age [FreeTextEntry1] : 1+ BL MARIELA

## 2023-08-10 NOTE — ASSESSMENT
[FreeTextEntry1] : Mr. WARE has seen me today for follow up  History of overactive bladder (OAB) (urinary urgency, frequency, and urge incontinence) and nocturia.  Has significant large volume voids overnight. Nocturnal symptoms most bothersome  His condition is complicated by overlying lower extremity edema, Lasix use IPSS: 22, irritative>obstructive symptoms Previously on tamsulosin - d/jose carlos due to light headedness  We have extensively reviewed the OAB care pathway and discussed:   1st line therapy:         -Behavioral therapy -limiting fluid intake, caffeine, and artificial sweeteners; maintaining a normal weight        -Medical comorbidities - managing sleep apnea, diabetes, lower extremity edema, and CHF if present         -Conservative management options - observation, pelvic floor physical therapy   Recommendations -optimize lasix dosing with PCP. recommend 10-20 mg Qd in the PM -continue compression stockings - inconsistent use -elevate legs for 90 mins in the evening -limit fluids after 7 PM -3 day voiding diary - instructions and supplies provided -RTC 2 weeks

## 2023-09-08 NOTE — ED CDU PROVIDER SUBSEQUENT DAY NOTE - MEDICAL DECISION MAKING DETAILS
Initial CDU note: 80 y/o male with PMHx of HTN and Prostate cancer who presented to the ED for episode of syncope today. Pt states after drinking tea this morning he felt lightheaded and went up stairs and sat on the toilet. Pt was noted to call out to his daughter and the daughter found him passed out slumped on the toilet and was out for 1-2 minutes. Daughter denies pt had any shaking. Pt states having a previous episode of syncope. Pt denies any headache, neck pain, back pain, fever, chills, nausea, vomiting, SOB, chest pain, or abd pain. Sent to [facility] for tele monitoring    Subsequent day CDU note: Agree with above. Pt is in CDU for concern of Syncope. In ED, HD stable. Labs reviewed Trop 21>19, EKG- no acute ischemic changes. In the Interim, pt remains stable on cardiac monitor, no acute events. Resting comfortably in no acute distress. Continue current CDU plan, Echo and Tele.
Previously Declined (within the last year)

## 2024-11-06 ENCOUNTER — APPOINTMENT (OUTPATIENT)
Dept: CARDIOLOGY | Facility: CLINIC | Age: 84
End: 2024-11-06
Payer: MEDICARE

## 2024-11-06 ENCOUNTER — NON-APPOINTMENT (OUTPATIENT)
Age: 84
End: 2024-11-06

## 2024-11-06 VITALS
TEMPERATURE: 98.5 F | DIASTOLIC BLOOD PRESSURE: 63 MMHG | BODY MASS INDEX: 27.58 KG/M2 | WEIGHT: 182 LBS | SYSTOLIC BLOOD PRESSURE: 127 MMHG | HEART RATE: 88 BPM | HEIGHT: 68 IN

## 2024-11-06 DIAGNOSIS — I35.0 NONRHEUMATIC AORTIC (VALVE) STENOSIS: ICD-10-CM

## 2024-11-06 DIAGNOSIS — I10 ESSENTIAL (PRIMARY) HYPERTENSION: ICD-10-CM

## 2024-11-06 DIAGNOSIS — R29.898 OTHER SYMPTOMS AND SIGNS INVOLVING THE MUSCULOSKELETAL SYSTEM: ICD-10-CM

## 2024-11-06 DIAGNOSIS — R60.0 LOCALIZED EDEMA: ICD-10-CM

## 2024-11-06 PROCEDURE — 93000 ELECTROCARDIOGRAM COMPLETE: CPT

## 2024-11-06 PROCEDURE — 99204 OFFICE O/P NEW MOD 45 MIN: CPT | Mod: 25

## 2025-02-20 ENCOUNTER — APPOINTMENT (OUTPATIENT)
Dept: UROLOGY | Facility: CLINIC | Age: 85
End: 2025-02-20

## 2025-05-07 ENCOUNTER — APPOINTMENT (OUTPATIENT)
Dept: CARDIOLOGY | Facility: CLINIC | Age: 85
End: 2025-05-07

## 2025-09-17 ENCOUNTER — NON-APPOINTMENT (OUTPATIENT)
Age: 85
End: 2025-09-17

## 2025-09-18 ENCOUNTER — APPOINTMENT (OUTPATIENT)
Dept: CARDIOLOGY | Facility: CLINIC | Age: 85
End: 2025-09-18
Payer: MEDICARE

## 2025-09-18 VITALS
WEIGHT: 182 LBS | DIASTOLIC BLOOD PRESSURE: 88 MMHG | HEART RATE: 84 BPM | HEIGHT: 68 IN | BODY MASS INDEX: 27.58 KG/M2 | SYSTOLIC BLOOD PRESSURE: 173 MMHG

## 2025-09-18 DIAGNOSIS — I10 ESSENTIAL (PRIMARY) HYPERTENSION: ICD-10-CM

## 2025-09-18 DIAGNOSIS — I35.0 NONRHEUMATIC AORTIC (VALVE) STENOSIS: ICD-10-CM

## 2025-09-18 DIAGNOSIS — R06.02 SHORTNESS OF BREATH: ICD-10-CM

## 2025-09-18 DIAGNOSIS — R60.0 LOCALIZED EDEMA: ICD-10-CM

## 2025-09-18 PROCEDURE — 93306 TTE W/DOPPLER COMPLETE: CPT

## 2025-09-18 PROCEDURE — 99214 OFFICE O/P EST MOD 30 MIN: CPT | Mod: 25

## 2025-09-18 PROCEDURE — 93000 ELECTROCARDIOGRAM COMPLETE: CPT

## 2025-09-18 RX ORDER — FUROSEMIDE 20 MG/1
20 TABLET ORAL DAILY
Qty: 90 | Refills: 0 | Status: ACTIVE | COMMUNITY
Start: 2025-09-18

## 2025-09-18 RX ORDER — METOPROLOL SUCCINATE 25 MG/1
25 TABLET, EXTENDED RELEASE ORAL
Qty: 90 | Refills: 1 | Status: ACTIVE | COMMUNITY
Start: 2025-09-18 | End: 1900-01-01